# Patient Record
Sex: FEMALE | Race: WHITE | NOT HISPANIC OR LATINO | Employment: FULL TIME | URBAN - METROPOLITAN AREA
[De-identification: names, ages, dates, MRNs, and addresses within clinical notes are randomized per-mention and may not be internally consistent; named-entity substitution may affect disease eponyms.]

---

## 2021-03-31 DIAGNOSIS — Z23 ENCOUNTER FOR IMMUNIZATION: ICD-10-CM

## 2022-10-29 ENCOUNTER — HOSPITAL ENCOUNTER (EMERGENCY)
Facility: HOSPITAL | Age: 62
Discharge: HOME | End: 2022-10-29
Attending: EMERGENCY MEDICINE

## 2022-10-29 ENCOUNTER — APPOINTMENT (EMERGENCY)
Dept: RADIOLOGY | Facility: HOSPITAL | Age: 62
End: 2022-10-29

## 2022-10-29 ENCOUNTER — APPOINTMENT (EMERGENCY)
Dept: CT IMAGING | Facility: HOSPITAL | Age: 62
End: 2022-10-29

## 2022-10-29 VITALS
SYSTOLIC BLOOD PRESSURE: 118 MMHG | WEIGHT: 180 LBS | TEMPERATURE: 98.7 F | HEIGHT: 67 IN | HEART RATE: 87 BPM | OXYGEN SATURATION: 99 % | RESPIRATION RATE: 17 BRPM | BODY MASS INDEX: 28.25 KG/M2 | DIASTOLIC BLOOD PRESSURE: 64 MMHG

## 2022-10-29 DIAGNOSIS — R53.1 GENERALIZED WEAKNESS: ICD-10-CM

## 2022-10-29 DIAGNOSIS — R07.9 CHEST PAIN WITH LOW RISK OF ACUTE CORONARY SYNDROME: Primary | ICD-10-CM

## 2022-10-29 LAB
2HR DELTA HS TROPONIN: -1 NG/L
ALBUMIN SERPL BCP-MCNC: 3.5 G/DL (ref 3.5–5)
ALP SERPL-CCNC: 100 U/L (ref 46–116)
ALT SERPL W P-5'-P-CCNC: 15 U/L (ref 12–78)
ANION GAP SERPL CALCULATED.3IONS-SCNC: 13 MMOL/L (ref 4–13)
APTT PPP: 34 SECONDS (ref 23–37)
ATRIAL RATE: 86 BPM
BACTERIA UR QL AUTO: NORMAL /HPF
BASOPHILS # BLD AUTO: 0.03 THOUSANDS/ÂΜL (ref 0–0.1)
BASOPHILS NFR BLD AUTO: 0 % (ref 0–1)
BILIRUB SERPL-MCNC: 1.4 MG/DL (ref 0.2–1)
BILIRUB UR QL STRIP: ABNORMAL
BUN SERPL-MCNC: 22 MG/DL (ref 5–25)
CALCIUM SERPL-MCNC: 10.3 MG/DL (ref 8.3–10.1)
CARDIAC TROPONIN I PNL SERPL HS: 6 NG/L
CARDIAC TROPONIN I PNL SERPL HS: 7 NG/L
CHLORIDE SERPL-SCNC: 93 MMOL/L (ref 96–108)
CLARITY UR: CLEAR
CO2 SERPL-SCNC: 28 MMOL/L (ref 21–32)
COLOR UR: ABNORMAL
CREAT SERPL-MCNC: 1.05 MG/DL (ref 0.6–1.3)
EOSINOPHIL # BLD AUTO: 0.01 THOUSAND/ÂΜL (ref 0–0.61)
EOSINOPHIL NFR BLD AUTO: 0 % (ref 0–6)
ERYTHROCYTE [DISTWIDTH] IN BLOOD BY AUTOMATED COUNT: 12.6 % (ref 11.6–15.1)
FLUAV RNA RESP QL NAA+PROBE: NEGATIVE
FLUBV RNA RESP QL NAA+PROBE: NEGATIVE
GFR SERPL CREATININE-BSD FRML MDRD: 56 ML/MIN/1.73SQ M
GLUCOSE SERPL-MCNC: 193 MG/DL (ref 65–140)
GLUCOSE SERPL-MCNC: 221 MG/DL (ref 65–140)
GLUCOSE UR STRIP-MCNC: ABNORMAL MG/DL
HCT VFR BLD AUTO: 42.1 % (ref 34.8–46.1)
HGB BLD-MCNC: 14.5 G/DL (ref 11.5–15.4)
HGB UR QL STRIP.AUTO: ABNORMAL
IMM GRANULOCYTES # BLD AUTO: 0.05 THOUSAND/UL (ref 0–0.2)
IMM GRANULOCYTES NFR BLD AUTO: 0 % (ref 0–2)
INR PPP: 1.14 (ref 0.84–1.19)
KETONES UR STRIP-MCNC: ABNORMAL MG/DL
LACTATE SERPL-SCNC: 1.9 MMOL/L (ref 0.5–2)
LEUKOCYTE ESTERASE UR QL STRIP: NEGATIVE
LYMPHOCYTES # BLD AUTO: 1.38 THOUSANDS/ÂΜL (ref 0.6–4.47)
LYMPHOCYTES NFR BLD AUTO: 11 % (ref 14–44)
MCH RBC QN AUTO: 28.7 PG (ref 26.8–34.3)
MCHC RBC AUTO-ENTMCNC: 34.4 G/DL (ref 31.4–37.4)
MCV RBC AUTO: 83 FL (ref 82–98)
MONOCYTES # BLD AUTO: 0.84 THOUSAND/ÂΜL (ref 0.17–1.22)
MONOCYTES NFR BLD AUTO: 6 % (ref 4–12)
NEUTROPHILS # BLD AUTO: 10.85 THOUSANDS/ÂΜL (ref 1.85–7.62)
NEUTS SEG NFR BLD AUTO: 83 % (ref 43–75)
NITRITE UR QL STRIP: NEGATIVE
NON-SQ EPI CELLS URNS QL MICRO: NORMAL /HPF
NRBC BLD AUTO-RTO: 0 /100 WBCS
PH UR STRIP.AUTO: 5.5 [PH]
PLATELET # BLD AUTO: 339 THOUSANDS/UL (ref 149–390)
PMV BLD AUTO: 10.4 FL (ref 8.9–12.7)
POTASSIUM SERPL-SCNC: 3.5 MMOL/L (ref 3.5–5.3)
PR INTERVAL: 170 MS
PROCALCITONIN SERPL-MCNC: 3.06 NG/ML
PROT SERPL-MCNC: 9 G/DL (ref 6.4–8.4)
PROT UR STRIP-MCNC: ABNORMAL MG/DL
PROTHROMBIN TIME: 15.4 SECONDS (ref 11.6–14.5)
QRS AXIS: 44 DEGREES
QRSD INTERVAL: 80 MS
QT INTERVAL: 376 MS
QTC INTERVAL: 449 MS
RBC # BLD AUTO: 5.06 MILLION/UL (ref 3.81–5.12)
RBC #/AREA URNS AUTO: NORMAL /HPF
RSV RNA RESP QL NAA+PROBE: NEGATIVE
SARS-COV-2 RNA RESP QL NAA+PROBE: NEGATIVE
SODIUM SERPL-SCNC: 134 MMOL/L (ref 135–147)
SP GR UR STRIP.AUTO: >=1.03 (ref 1–1.03)
T WAVE AXIS: 75 DEGREES
UROBILINOGEN UR STRIP-ACNC: 4 MG/DL
VENTRICULAR RATE: 86 BPM
WBC # BLD AUTO: 13.16 THOUSAND/UL (ref 4.31–10.16)
WBC #/AREA URNS AUTO: NORMAL /HPF

## 2022-10-29 RX ADMIN — SODIUM CHLORIDE 1000 ML: 0.9 INJECTION, SOLUTION INTRAVENOUS at 17:49

## 2022-10-29 NOTE — ED PROVIDER NOTES
History  Chief Complaint   Patient presents with   • Weakness - Generalized     Weakness for a few days, ongoing memory issues which she has been seen for by neurologist     This is a 56 y/o female with PMH depression, DM, HLD, HTN, pacemaker, seizure history who presents to the ER today with chest pain on and off for one week and chills that started today  Patient describes the chest pain as a sharp, stabbing pain that comes and goes, does not radiate to back, arm or jaw  She denies having it currently and does not state anything makes it better or worse  Also admits to chills that she is experiencing intermittently throughout the day  She denies any shortness of breath, heart palpitations, abdominal pain, nausea, vomiting, changes in urination or bowel movements, fevers, headache, dizziness, body aches, leg swelling, difficulty walking or speaking, URI symptoms, numbness, tingling, weakness  Patient admits to chronic knee pain that has not changed  She states that over the past year she has developed memory issues which she has seen a neurologist before, has not been diagnosed with anything or started any treatments for this  She denies sick contacts, recent travel, recent hospitalizations or surgeries         History provided by:  Patient   used: No    Chest Pain  Pain location:  L chest  Pain quality: sharp and stabbing    Pain radiates to the back: no    Pain severity:  Mild  Duration:  1 week  Timing:  Intermittent  Progression:  Waxing and waning  Ineffective treatments:  None tried  Associated symptoms: no abdominal pain, no back pain, no dizziness, no fever, no headache, no nausea, no numbness, no palpitations, no shortness of breath, not vomiting and no weakness        None       Past Medical History:   Diagnosis Date   • Depression    • Diabetes mellitus ([facility] [address] )    • Hyperlipemia    • Hypertension    • Pacemaker    • Seizure [facility])        Past Surgical History:   Procedure Laterality Date • CARDIAC PACEMAKER PLACEMENT         History reviewed  No pertinent family history  I have reviewed and agree with the history as documented  E-Cigarette/Vaping   • E-Cigarette Use Never User      E-Cigarette/Vaping Substances     Social History     Tobacco Use   • Smoking status: Former Smoker   • Smokeless tobacco: Never Used   • Tobacco comment: quit 10 years ago   Vaping Use   • Vaping Use: Never used   Substance Use Topics   • Alcohol use: Yes     Comment: occasionally   • Drug use: Never       Review of Systems   Constitutional: Positive for chills  Negative for fever  HENT: Negative for congestion, rhinorrhea and sore throat  Respiratory: Negative for chest tightness and shortness of breath  Cardiovascular: Positive for chest pain  Negative for palpitations and leg swelling  Gastrointestinal: Negative for abdominal pain, blood in stool, constipation, diarrhea, nausea and vomiting  Genitourinary: Negative for difficulty urinating, dysuria and hematuria  Musculoskeletal: Negative for back pain and neck pain  Skin: Negative for color change  Neurological: Negative for dizziness, weakness, numbness and headaches  Psychiatric/Behavioral: Negative for behavioral problems and sleep disturbance  All other systems reviewed and are negative  Physical Exam  Physical Exam  Vitals and nursing note reviewed  Constitutional:       General: She is awake  Appearance: Normal appearance  She is well-developed  HENT:      Head: Normocephalic and atraumatic  Right Ear: Tympanic membrane, ear canal and external ear normal       Left Ear: Tympanic membrane, ear canal and external ear normal       Nose: Nose normal       Mouth/Throat:      Mouth: Mucous membranes are moist       Pharynx: Oropharynx is clear  No oropharyngeal exudate or posterior oropharyngeal erythema  Eyes:      General: No scleral icterus  Extraocular Movements: Extraocular movements intact  Conjunctiva/sclera: Conjunctivae normal       Pupils: Pupils are equal, round, and reactive to light  Cardiovascular:      Rate and Rhythm: Normal rate and regular rhythm  Heart sounds: Normal heart sounds, S1 normal and S2 normal  No murmur heard  No gallop  Pulmonary:      Effort: Pulmonary effort is normal       Breath sounds: Normal breath sounds  No wheezing, rhonchi or rales  Abdominal:      General: Abdomen is flat  Palpations: Abdomen is soft  Tenderness: There is no abdominal tenderness  There is no guarding or rebound  Musculoskeletal:         General: Normal range of motion  Cervical back: Normal range of motion  Skin:     General: Skin is warm and dry  Neurological:      General: No focal deficit present  Mental Status: She is alert and oriented to person, place, and time  GCS: GCS eye subscore is 4  GCS verbal subscore is 5  GCS motor subscore is 6  Cranial Nerves: Cranial nerves are intact  No facial asymmetry  Sensory: Sensation is intact  Motor: Motor function is intact  No pronator drift  Coordination: Finger-Nose-Finger Test and Heel to [employer] normal  Rapid alternating movements normal    Psychiatric:         Attention and Perception: Attention and perception normal          Mood and Affect: Mood normal          Behavior: Behavior normal  Behavior is cooperative           Vital Signs  ED Triage Vitals [10/29/22 1447]   Temperature Pulse Respirations Blood Pressure SpO2   98 7 °F (37 1 °C) 91 16 139/72 99 %      Temp Source Heart Rate Source Patient Position - Orthostatic VS BP Location FiO2 (%)   Temporal Monitor Sitting Right arm --      Pain Score       No Pain           Vitals:    10/29/22 1447 10/29/22 1630 10/29/22 1730 10/29/22 2000   BP: 139/72 129/75 124/67 118/64   Pulse: 91 90 87 87   Patient Position - Orthostatic VS: Sitting   Lying         Visual Acuity  Visual Acuity    Flowsheet Row Most Recent Value   L Pupil Size (mm) 3   R Pupil Size (mm) 3          ED Medications  Medications   sodium chloride 0 9 % bolus 1,000 mL (0 mL Intravenous Stopped 10/29/22 2031)       Diagnostic Studies  Results Reviewed     Procedure Component Value Units Date/Time    HS Troponin I 2hr [[id number]]  (Normal) Collected: 10/29/22 1746    Lab Status: Final result Specimen: Blood from Arm, Left Updated: 10/29/22 1823     hs TnI 2hr 6 ng/L      Delta 2hr hsTnI -1 ng/L     Fingerstick Glucose (POCT) [[id number]]  (Abnormal) Collected: 10/29/22 1801    Lab Status: Final result Updated: 10/29/22 1801     POC Glucose 193 mg/dl     Urine Microscopic [[id number]]  (Normal) Collected: 10/29/22 1702    Lab Status: Final result Specimen: Urine, Clean Catch Updated: 10/29/22 1750     RBC, UA 0-1 /hpf      WBC, UA None Seen /hpf      Epithelial Cells Occasional /hpf      Bacteria, UA Occasional /hpf     UA w Reflex to Microscopic w Reflex to Culture [[id number]]  (Abnormal) Collected: 10/29/22 1702    Lab Status: Final result Specimen: Urine, Clean Catch Updated: 10/29/22 1736     Color, UA Light Brown     Clarity, UA Clear     Specific Gravity, UA >=1 030     pH, UA 5 5     Leukocytes, UA Negative     Nitrite, UA Negative     Protein,  (2+) mg/dl      Glucose,  (3/20%) mg/dl      Ketones, UA 40 (2+) mg/dl      Urobilinogen, UA 4 0 mg/dl      Bilirubin, UA Small     Occult Blood, UA Trace    FLU/RSV/COVID - if FLU/RSV clinically relevant [[id number]]  (Normal) Collected: 10/29/22 1536    Lab Status: Final result Specimen: Nares from Nose Updated: 10/29/22 1632     SARS-CoV-2 Negative     INFLUENZA A PCR Negative     INFLUENZA B PCR Negative     RSV PCR Negative    Narrative:      FOR PEDIATRIC PATIENTS - copy/paste COVID Guidelines URL to browser: [email or web address]    SARS-CoV-2 assay is a Nucleic Acid Amplification assay intended for the  qualitative detection of nucleic acid from SARS-CoV-2 in nasopharyngeal  swabs  Results are for the presumptive identification of SARS-CoV-2 RNA  Positive results are indicative of infection with SARS-CoV-2, the virus  causing COVID-19, but do not rule out bacterial infection or co-infection  with other viruses  Laboratories within the [place] and its  territories are required to report all positive results to the appropriate  public health authorities  Negative results do not preclude SARS-CoV-2  infection and should not be used as the sole basis for treatment or other  patient management decisions  Negative results must be combined with  clinical observations, patient history, and epidemiological information  This test has not been FDA cleared or approved  This test has been authorized by FDA under an Emergency Use Authorization  (EUA)  This test is only authorized for the duration of time the  declaration that circumstances exist justifying the authorization of the  emergency use of an in vitro diagnostic tests for detection of SARS-CoV-2  virus and/or diagnosis of COVID-19 infection under section 564(b)(1) of  the Act, 21 U  S C  264MAJ-2(E)(5), unless the authorization is terminated  or revoked sooner  The test has been validated but independent review by FDA  and CLIA is pending  Test performed using [employer] Gene[employer]pert: This RT-PCR assay targets N2,  a region unique to SARS-CoV-2  A conserved region in the E-gene was chosen  for pan-Sarbecovirus detection which includes SARS-CoV-2  According to CMS-2020-01-R, this platform meets the definition of high-throughput technology      Protime-INR [[id number]]  (Abnormal) Collected: 10/29/22 1524    Lab Status: Final result Specimen: Blood from Arm, Left Updated: 10/29/22 1614     Protime 15 4 seconds      INR 1 14    APTT [[id number]]  (Normal) Collected: 10/29/22 1524    Lab Status: Final result Specimen: Blood from Arm, Left Updated: 10/29/22 1614     PTT 34 seconds     Lactic acid [[id number]]  (Normal) Collected: 10/29/22 1536    Lab Status: Final result Specimen: Blood from Arm, Left Updated: 10/29/22 1613     LACTIC ACID 1 9 mmol/L     Narrative:      Result may be elevated if tourniquet was used during collection      Procalcitonin [[id number]]  (Abnormal) Collected: 10/29/22 1524    Lab Status: Final result Specimen: Blood from Arm, Left Updated: 10/29/22 1612     Procalcitonin 3 06 ng/ml     HS Troponin 0hr (reflex protocol) [[id number]]  (Normal) Collected: 10/29/22 1524    Lab Status: Final result Specimen: Blood from Arm, Left Updated: 10/29/22 1610     hs TnI 0hr 7 ng/L     Comprehensive metabolic panel [[id number]]  (Abnormal) Collected: 10/29/22 1524    Lab Status: Final result Specimen: Blood from Arm, Left Updated: 10/29/22 1608     Sodium 134 mmol/L      Potassium 3 5 mmol/L      Chloride 93 mmol/L      CO2 28 mmol/L      ANION GAP 13 mmol/L      BUN 22 mg/dL      Creatinine 1 05 mg/dL      Glucose 221 mg/dL      Calcium 10 3 mg/dL      AST --     ALT 15 U/L      Alkaline Phosphatase 100 U/L      Total Protein 9 0 g/dL      Albumin 3 5 g/dL      Total Bilirubin 1 40 mg/dL      eGFR 56 ml/min/1 73sq m     Narrative:      [place] guidelines for Chronic Kidney Disease (CKD):   •  Stage 1 with normal or high GFR (GFR > 90 mL/min/1 73 square meters)  •  Stage 2 Mild CKD (GFR = 60-89 mL/min/1 73 square meters)  •  Stage 3A Moderate CKD (GFR = 45-59 mL/min/1 73 square meters)  •  Stage 3B Moderate CKD (GFR = 30-44 mL/min/1 73 square meters)  •  Stage 4 Severe CKD (GFR = 15-29 mL/min/1 73 square meters)  •  Stage 5 End Stage CKD (GFR <15 mL/min/1 73 square meters)  Note: GFR calculation is accurate only with a steady state creatinine    CBC and differential [[id number]]  (Abnormal) Collected: 10/29/22 1524    Lab Status: Final result Specimen: Blood from Arm, Left Updated: 10/29/22 1541     WBC 13 16 Thousand/uL      RBC 5 06 Million/uL      Hemoglobin 14 5 g/dL      Hematocrit 42 1 % MCV 83 fL      MCH 28 7 pg      MCHC 34 4 g/dL      RDW 12 6 %      MPV 10 4 fL      Platelets 643 Thousands/uL      nRBC 0 /100 WBCs      Neutrophils Relative 83 %      Immat GRANS % 0 %      Lymphocytes Relative 11 %      Monocytes Relative 6 %      Eosinophils Relative 0 %      Basophils Relative 0 %      Neutrophils Absolute 10 85 Thousands/µL      Immature Grans Absolute 0 05 Thousand/uL      Lymphocytes Absolute 1 38 Thousands/µL      Monocytes Absolute 0 84 Thousand/µL      Eosinophils Absolute 0 01 Thousand/µL      Basophils Absolute 0 03 Thousands/µL                  CT head without contrast   Final Result by Dhara Posey MD (10/29 1941)      No acute intracranial abnormality  Workstation performed: [id number]         XR chest 2 views    (Results Pending)              Procedures  Procedures         ED Course  ED Course as of 10/30/22 0029   Sat Oct 29, 2022   [id number] Daughter now present in room and states the reason the ambulance was called is because patient thought someone was in her house but noone was there  Added CT of head non contrast  Could be due to worsening dementia but will r/o any intracranial abnormality   1957 Patient got 2 L NS, feeling much better  Head CT negative  Stable for discharge to daughter's house with daughter  HEART Risk Score    Flowsheet Row Most Recent Value   Heart Score Risk Calculator    History 0 Filed at: 10/30/2022 0028   ECG 0 Filed at: 10/30/2022 0028   Age 1 Filed at: 10/30/2022 0028   Risk Factors 2 Filed at: 10/30/2022 0028   Troponin 0 Filed at: 10/30/2022 0028   HEART Score 3 Filed at: 10/30/2022 0028                                      MDM  Number of Diagnoses or Management Options  Chest pain with low risk of acute coronary syndrome: new and requires workup  Generalized weakness: new and requires workup  Diagnosis management comments: 56 y/o female here with chest pain on and off for a week and chills that started today  Differential diagnosis: ACS, angina, viral syndrome, covid/flu/rsv, pneumonia, UTI  Assessment:  Chest pain, resolved  generalized weakness, dehydration  Plan: slight elevation in WBC and procalcitonin - no infectious processes found on exam  Patient found to be dehydrated so could cause increase In those labs  She was given 2 L NS and states she felt much better after  Head CT negative for acute abnormality  Patients daughter was advised to bring patient home with her and schedule follow up with PCP and neurology  Patient Seeing people in house likely symptom of developing memory loss patient already experiencing  Patients daughter agreed to this plan  Patient and her daughter were given strict return to ER precautions both verbally and in discharge papers  Patient and daughter verbalized understanding and agrees with plan  Amount and/or Complexity of Data Reviewed  Clinical lab tests: ordered and reviewed  Tests in the radiology section of CPT®: ordered and reviewed    Risk of Complications, Morbidity, and/or Mortality  Presenting problems: moderate  Diagnostic procedures: low  Management options: low    Patient Progress  Patient progress: improved      Disposition  Final diagnoses:   Chest pain with low risk of acute coronary syndrome   Generalized weakness     Time reflects when diagnosis was documented in both MDM as applicable and the Disposition within this note     Time User Action Codes Description Comment    10/29/2022  7:51 PM Ulices Valerio [R07 9] Chest pain with low risk of acute coronary syndrome     10/29/2022  7:52 PM Ulices Valerio [R53 1] Generalized weakness       ED Disposition     ED Disposition   Discharge    Condition   Stable    Date/Time   Sat Oct 29, 2022  7:51 PM    Comment   Yanique Lee discharge to home/self care                 Follow-up Information     Follow up With Specialties Details Why Contact Info Additional Information    PCP  Schedule an appointment as soon as possible for a visit  for follow-up evaluation regarding symptoms      Neurology  Call  for evaluation regarding ER visit       [address] Emergency Department Emergency Medicine Go to  if you develop chest pain, shortness of breath, difficulty breathing, intense headache, increased confusion, weakness, numbness, facial droop, difficulty walking, difficulty speaking [address] [place], [address] Emergency Department, [address] [place] [address], [facility], [facility] [address]          There are no discharge medications for this patient  No discharge procedures on file      PDMP Review     None          ED Provider  Electronically Signed by           Gonzalo Craig PA-C  10/30/22 8739

## 2022-10-29 NOTE — DISCHARGE INSTRUCTIONS
Make sure you are staying well hydrated with lots of fluids, gatorade, electrolyte drinks  Eat 3 healthy meals per day  Schedule an appointment with the PCP regarding ED visit  Schedule an appointment with neurology for evaluation regarding ER visit  Return to the ER if you develop chest pain, shortness of breath, difficulty breathing, intense headache, increased confusion, weakness, numbness, facial droop, difficulty walking, difficulty speaking